# Patient Record
(demographics unavailable — no encounter records)

---

## 2024-10-10 NOTE — HISTORY OF PRESENT ILLNESS
[FreeTextEntry1] : Patient returns today for chronic rhinitis , nasal swelling .  Patient is her to discuss surgery.  no change in symptoms

## 2024-10-10 NOTE — PROCEDURE
[None] : none [Rigid Endoscope] : examined with a rigid endoscope [Congested] : congested [S-Shaped Deviated] : S-shape deviation [FreeTextEntry6] : .nasal

## 2024-10-10 NOTE — PHYSICAL EXAM
[Normal] : mucosa is normal [Midline] : trachea located in midline position [] : septum deviated bilaterally

## 2024-10-10 NOTE — REASON FOR VISIT
[Subsequent Evaluation] : a subsequent evaluation for [FreeTextEntry2] : chronic rhinitis , nasal swelling

## 2024-11-14 NOTE — HISTORY OF PRESENT ILLNESS
[FreeTextEntry1] : Patient returns today for chronic rhinitis , nasal swelling. Patient is her to discuss surgery and review her CT scan done. No further complaints

## 2024-11-14 NOTE — DATA REVIEWED
[de-identified] : Test was reviewed  and interpreted by me. See official report below.  EXAM:  CT SINUSES   ORDERED BY: JIN QUINTANA  PROCEDURE DATE:  10/31/2024    INTERPRETATION:  CT EXAMINATION OF THE PARANASAL SINUSES  CLINICAL INDICATION: Chronic sinusitis.  TECHNIQUE: CT examination of the paranasal sinuses was performed. These images were used to create axial, coronal and sagittal reformatted images through the paranasal sinuses. No intravenous contrast was administered.  The images were reviewed in bone and soft-tissue windows.  COMPARISON: None available  FINDINGS:  Sinocranial & sinoorbital junctions: The lamina papyracea, cribriform plates and fovea ethmoidalis are intact.  Nasal septum and nasal cavity: Status post right middle turbinectomy and bilateral antrostomy. Mild rightward nasal septal deviation. Mild mucosal thickening in the left nasal cavity with a left middle turbinate abutting the nasal septum.  Frontal sinuses, drainage pathways, and associated anatomic variants: Mild mucosal thickening in bilateral frontal sinuses resulting in narrowing of the frontal sinus outflow tract.  Ethmoid sinuses: Mild mucosal thickening bilateral ethmoid sinuses.  Sphenoid sinuses, drainage pathways, and associated variants: Trace mucosal thickening in the bilateral sphenoid sinuses resulting in narrowing of the sphenoethmoidal recess. The carotid canals are covered by bone.  Maxillary sinuses, drainage pathways, and associated variants: Status post bilateral antrostomy. Moderate right/mild left mucosal thickening in the maxillary sinuses.  Other: Partially visualized intracranial structures: Normal Orbits: Normal Mastoid air cells: Normal Temporomandibular joints: Unremarkable.   IMPRESSION:  Status post right middle turbinectomy and bilateral maxillary antrostomy. Mild-to-moderate inflammatory mucosal thickening throughout the paranasal sinuses and their respective drainage pathways, worse in the right maxillary sinus.  Rightward nasal septal deviation.  Sinonasal anatomic variations.

## 2024-11-14 NOTE — REASON FOR VISIT
[Subsequent Evaluation] : a subsequent evaluation for [FreeTextEntry2] : chronic rhinitis , nasal swelling.

## 2024-11-14 NOTE — PROCEDURE
[Topical Lidocaine] : topical lidocaine [Oxymetazoline HCl] : oxymetazoline HCl [Flexible Endoscope] : examined with the flexible endoscope [Congested] : congested [Normal] : the septum showed no abnormalities [FreeTextEntry6] : post operative, No evidence  [de-identified] : rhinitis

## 2024-12-23 NOTE — ASSESSMENT
[FreeTextEntry1] : I have reviewed the risk and benefits of maxillary antrostomy with removal of contents, total ethmoidectomy, sphenoidotomy with removal of contents, frontal sinusotomy with removal of contents, and CT navigation with the patient. They include but are not limited to bleeding, infection, recurrent symptoms and return to OR, change in taste and smell, injury to the orbit and brain, CSF leak, and need for postoperative treatment.  Risks, benefits, and alternatives of clarifix were explained including but not limited to bleeding, infection, persistent symptoms, numbness, perforation, change in smell, change in taste, need for additional surgery, etc...

## 2024-12-23 NOTE — REASON FOR VISIT
LOV: 9/8/23  NOV: 12/15/23  Last refill: 9/14/23   [Subsequent Evaluation] : a subsequent evaluation for [FreeTextEntry2] : chronic sinusitis, nasal swelling, nasal obstruction

## 2024-12-23 NOTE — PHYSICAL EXAM
[Normal] : mucosa is normal [Midline] : trachea located in midline position [de-identified] : edema

## 2024-12-23 NOTE — PROCEDURE
[None] : none [Flexible Endoscope] : examined with the flexible endoscope [Congested] : congested [Zoe] : zoe [de-identified] : sinusitis  [FreeTextEntry6] : The following anatomic sites were directly examined in a sequential fashion: The scope was introduced in the nasal passage between the middle and inferior turbinates to exam the inferior portion of the middle meatus and the fontanelle, as well as the maxillary ostia. Next, the scope was passed medically and posteriorly to the middle turbinates to examine the sphenoethmoid recess and the superior turbinate region.

## 2024-12-23 NOTE — HISTORY OF PRESENT ILLNESS
[FreeTextEntry1] : Patient returns today c/o chronic sinusitis, nasal swelling, nasal obstruction. States that she continues to have runny nose. Used Famotidine, Pantoprazole with no improvement. Here today to discuss surgery.